# Patient Record
Sex: FEMALE | Race: WHITE | NOT HISPANIC OR LATINO | Employment: FULL TIME | URBAN - METROPOLITAN AREA
[De-identification: names, ages, dates, MRNs, and addresses within clinical notes are randomized per-mention and may not be internally consistent; named-entity substitution may affect disease eponyms.]

---

## 2020-07-01 ENCOUNTER — OFFICE VISIT (OUTPATIENT)
Dept: GASTROENTEROLOGY | Facility: CLINIC | Age: 45
End: 2020-07-01
Payer: COMMERCIAL

## 2020-07-01 VITALS
BODY MASS INDEX: 43.14 KG/M2 | WEIGHT: 214 LBS | TEMPERATURE: 98.3 F | HEIGHT: 59 IN | DIASTOLIC BLOOD PRESSURE: 88 MMHG | HEART RATE: 66 BPM | SYSTOLIC BLOOD PRESSURE: 126 MMHG

## 2020-07-01 DIAGNOSIS — R19.4 CHANGE IN BOWEL HABITS: ICD-10-CM

## 2020-07-01 DIAGNOSIS — R19.7 DIARRHEA, UNSPECIFIED TYPE: Primary | ICD-10-CM

## 2020-07-01 DIAGNOSIS — R12 HEARTBURN: ICD-10-CM

## 2020-07-01 PROCEDURE — 99203 OFFICE O/P NEW LOW 30 MIN: CPT | Performed by: NURSE PRACTITIONER

## 2020-07-01 RX ORDER — CHOLESTYRAMINE 4 G/9G
1 POWDER, FOR SUSPENSION ORAL 2 TIMES DAILY WITH MEALS
Qty: 60 PACKET | Refills: 2 | Status: SHIPPED | OUTPATIENT
Start: 2020-07-01

## 2020-07-01 NOTE — PATIENT INSTRUCTIONS
Check stool studies  Start taking Questran twice daily  Continue Imodium as needed     Arrange for colonoscopy at [facility]     Follow up in 6-8 weeks

## 2020-07-01 NOTE — PROGRESS NOTES
[address] [facility] - Outpatient Consultation  Ara López 38 y o  female MRN: [id number]  Encounter: [id number]    ASSESSMENT AND PLAN:      1  Diarrhea, unspecified type  2  Change in bowel habits  Change in bowel habits and diarrhea over the past 3 weeks  Has had an average of 4-6 episodes of diarrhea per day that occur almost immediately after eating  She did see her PCP who ordered labs and stool studies  Lab work reviewed including TSH, ESR, CRP, CBC, celiac panel which were all normal   C diff, fecal leukocytes, ova and parasites and stool enteric panel also negative  The patient did start taking Zoloft about 2 months ago for hormonal issues  Differential diagnoses include IBS, IBD, microscopic colitis, infectious cause  This could also be medication related as well  - will trial cholestyramine (QUESTRAN) 4 g packet; Take 1 packet (4 g total) by mouth 2 (two) times a day with meals  Dispense: 60 packet; Refill: 2  - check Giardia and Cryptosporidium Antigen Panel; Future  - will arrange for colonoscopy at [facility] [place]   - can take Imodium as needed  - if colonoscopy is unremarkable and symptoms persist, may need to follow up with PCP to consider switching to a different agent besides Zoloft      3  Heartburn   Occasional heartburn and reflux symptoms which are relieved with over-the-counter Tums  Denies any alarm symptoms  - continue to take Tums as needed  - GERD lifestyle modifications discussed with patient    Followup Appointment: 6-8 weeks   ______________________________________________________________________    Chief Complaint   Patient presents with    Diarrhea      3 weeks Dr Johanny Shahid       HPI:   Ara óLpez is a 38y o  year old female who presents to the office today as a new patient  She was referred by her PCP Dr Johanny Shahid  She states that she has had persistent diarrhea for the past 3 weeks    She reports that she has approximately 4-6 episodes of watery diarrhea per day  Her symptoms seem to be worse after meals, she reports that if she eats something she will have to use the bathroom within 30 minutes to 1 hour of eating  This is a change in her bowel habits, previously she was moving her bowels every day  She has had occasional abdominal cramping that does improve with a bowel movement  She reports that her stools were dark green in color and also looked black a few days ago  Associated with fatigue  She did start taking Zoloft about 2 months ago for hot flashes  She denies any other new medications, recent travel, antibiotic use, nocturnal stools, fecal incontinence, increased stress or anxiety  She denies any fever, chills, rectal bleeding  Her appetite and weight are stable  She does have intermittent heartburn and reflux symptoms which she takes Tums OTC PRN  She denies any dysphagia, odynophagia, early satiety  Historical Information   Past Medical History:   Diagnosis Date    Rheumatoid arthritis ([facility] [address] )      Past Surgical History:   Procedure Laterality Date     SECTION      TUBAL LIGATION      WRIST SURGERY       Social History     Substance and Sexual Activity   Alcohol Use Yes     Social History     Substance and Sexual Activity   Drug Use Not on file     Social History     Tobacco Use   Smoking Status Never Smoker   Smokeless Tobacco Never Used     Family History   Problem Relation Age of Onset    Cancer Father         esophageal     Inflammatory bowel disease Sister        Meds/Allergies     Current Outpatient Medications:     adalimumab (HUMIRA) 40 mg/0 8 mL PSKT    sertraline (ZOLOFT) 50 mg tablet    cholestyramine (QUESTRAN) 4 g packet    No Known Allergies    PHYSICAL EXAM:    Blood pressure 126/88, pulse 66, temperature 98 3 °F (36 8 °C), height 4' 11" (1 499 m), weight 97 1 kg (214 lb)  Body mass index is 43 22 kg/m²    General Appearance: NAD, cooperative, alert  Eyes: Anicteric, PERRLA, EOMI  ENT:  Normocephalic, atraumatic, normal mucosa  Neck:  Supple, symmetrical, trachea midline,   Resp:  Clear to auscultation bilaterally; no rales, rhonchi or wheezing; respirations unlabored   CV:  S1 S2, Regular rate and rhythm; no murmur, rub, or gallop  GI:  Soft, non-tender, non-distended; normal bowel sounds; no masses, no organomegaly   Rectal: Deferred  Musculoskeletal: No cyanosis, clubbing or edema  Normal ROM  Skin:  No jaundice, rashes, or lesions   Heme/Lymph: No palpable cervical lymphadenopathy  Psych: Normal affect, good eye contact  Neuro: No gross deficits, AAOx3    Lab Results:   No results found for: WBC, HGB, HCT, MCV, PLT  No results found for: NA, K, CL, CO2, ANIONGAP, BUN, CREATININE, GLUCOSE, GLUF, CALCIUM, CORRECTEDCA, AST, ALT, ALKPHOS, PROT, BILITOT, EGFR  No results found for: IRON, TIBC, FERRITIN  No results found for: LIPASE    Radiology Results:   No results found  REVIEW OF SYSTEMS:    CONSTITUTIONAL: Denies any fever, chills, rigors, and weight loss  HEENT: No earache or tinnitus  Denies hearing loss or visual disturbances  CARDIOVASCULAR: No chest pain or palpitations  RESPIRATORY: Denies any cough, hemoptysis, shortness of breath or dyspnea on exertion  GASTROINTESTINAL: As noted in the History of Present Illness  GENITOURINARY: No problems with urination  Denies any hematuria or dysuria  NEUROLOGIC: No dizziness or vertigo, denies headaches  MUSCULOSKELETAL: Denies any muscle or joint pain  SKIN: Denies skin rashes or itching  ENDOCRINE: Denies excessive thirst  Denies intolerance to heat or cold  PSYCHOSOCIAL: Denies depression or anxiety  Denies any recent memory loss

## 2020-07-07 ENCOUNTER — CLINICAL SUPPORT (OUTPATIENT)
Dept: GASTROENTEROLOGY | Facility: CLINIC | Age: 45
End: 2020-07-07

## 2020-07-07 VITALS — HEIGHT: 59 IN | WEIGHT: 214 LBS | BODY MASS INDEX: 43.14 KG/M2

## 2020-07-07 DIAGNOSIS — R19.7 DIARRHEA, UNSPECIFIED TYPE: Primary | ICD-10-CM

## 2020-07-07 DIAGNOSIS — Z11.59 SCREENING FOR VIRAL DISEASE: ICD-10-CM

## 2020-07-07 PROCEDURE — U0003 INFECTIOUS AGENT DETECTION BY NUCLEIC ACID (DNA OR RNA); SEVERE ACUTE RESPIRATORY SYNDROME CORONAVIRUS 2 (SARS-COV-2) (CORONAVIRUS DISEASE [COVID-19]), AMPLIFIED PROBE TECHNIQUE, MAKING USE OF HIGH THROUGHPUT TECHNOLOGIES AS DESCRIBED BY CMS-2020-01-R: HCPCS

## 2020-07-07 NOTE — PROGRESS NOTES
Virtual colon prep  Dx: diarrhea  Rx sent to provider for signature  Instructions for clenpiq given  Meds reviewed  Covid testing confirmed for 7/7/2020

## 2020-07-09 LAB
INPATIENT: NORMAL
SARS-COV-2 RNA SPEC QL NAA+PROBE: NOT DETECTED

## 2020-07-14 ENCOUNTER — HOSPITAL ENCOUNTER (OUTPATIENT)
Dept: GASTROENTEROLOGY | Facility: AMBULATORY SURGERY CENTER | Age: 45
Discharge: HOME | End: 2020-07-14
Payer: COMMERCIAL

## 2020-07-14 ENCOUNTER — ANESTHESIA EVENT (OUTPATIENT)
Dept: GASTROENTEROLOGY | Facility: AMBULATORY SURGERY CENTER | Age: 45
End: 2020-07-14

## 2020-07-14 ENCOUNTER — ANESTHESIA (OUTPATIENT)
Dept: GASTROENTEROLOGY | Facility: AMBULATORY SURGERY CENTER | Age: 45
End: 2020-07-14

## 2020-07-14 VITALS
DIASTOLIC BLOOD PRESSURE: 59 MMHG | TEMPERATURE: 97.3 F | OXYGEN SATURATION: 98 % | SYSTOLIC BLOOD PRESSURE: 100 MMHG | HEART RATE: 65 BPM | RESPIRATION RATE: 19 BRPM

## 2020-07-14 DIAGNOSIS — R19.4 CHANGE IN BOWEL HABITS: ICD-10-CM

## 2020-07-14 DIAGNOSIS — R19.7 DIARRHEA, UNSPECIFIED TYPE: ICD-10-CM

## 2020-07-14 PROCEDURE — 88305 TISSUE EXAM BY PATHOLOGIST: CPT | Performed by: PATHOLOGY

## 2020-07-14 PROCEDURE — 45380 COLONOSCOPY AND BIOPSY: CPT | Performed by: INTERNAL MEDICINE

## 2020-07-14 RX ORDER — SODIUM CHLORIDE 9 MG/ML
50 INJECTION, SOLUTION INTRAVENOUS CONTINUOUS
Status: DISCONTINUED | OUTPATIENT
Start: 2020-07-14 | End: 2020-07-18 | Stop reason: HOSPADM

## 2020-07-14 RX ORDER — PROPOFOL 10 MG/ML
INJECTION, EMULSION INTRAVENOUS AS NEEDED
Status: DISCONTINUED | OUTPATIENT
Start: 2020-07-14 | End: 2020-07-14 | Stop reason: SURG

## 2020-07-14 RX ADMIN — PROPOFOL 200 MG: 10 INJECTION, EMULSION INTRAVENOUS at 14:09

## 2020-07-14 RX ADMIN — SODIUM CHLORIDE 50 ML/HR: 9 INJECTION, SOLUTION INTRAVENOUS at 13:30

## 2020-07-14 RX ADMIN — PROPOFOL 200 MG: 10 INJECTION, EMULSION INTRAVENOUS at 13:56

## 2020-07-14 RX ADMIN — SODIUM CHLORIDE: 9 INJECTION, SOLUTION INTRAVENOUS at 13:45

## 2020-07-14 RX ADMIN — PROPOFOL 200 MG: 10 INJECTION, EMULSION INTRAVENOUS at 14:04

## 2020-07-14 NOTE — H&P
History and Physical - [facility]  Nallely Mann 38 y o  female MRN: [id number]                  HPI: Nallely Mann is a 38y o  year old female who presents for diarrhea  REVIEW OF SYSTEMS: Per the HPI, and otherwise unremarkable  Historical Information   Past Medical History:   Diagnosis Date    Rheumatoid arthritis ([address] )      Past Surgical History:   Procedure Laterality Date     SECTION      TUBAL LIGATION      WRIST SURGERY       Social History   Social History     Substance and Sexual Activity   Alcohol Use Yes    Frequency: Monthly or less     Social History     Substance and Sexual Activity   Drug Use Never     Social History     Tobacco Use   Smoking Status Never Smoker   Smokeless Tobacco Never Used     Family History   Problem Relation Age of Onset    Cancer Father         esophageal     Inflammatory bowel disease Sister        Meds/Allergies     Current Outpatient Medications:     cholestyramine (QUESTRAN) 4 g packet    sertraline (ZOLOFT) 50 mg tablet    Sod Picosulfate-Mag Ox-Cit Acd (Clenpiq) 10-3 5-12 MG-GM -GM/160ML SOLN    adalimumab (HUMIRA) 40 mg/0 8 mL PSKT    Current Facility-Administered Medications:     sodium chloride 0 9 % infusion, 50 mL/hr, Intravenous, Continuous, 50 mL/hr at 20 1330    Allergies   Allergen Reactions    Cefuroxime Rash       Objective     /69   Pulse 80   Temp (!) 97 3 °F (36 3 °C) (Temporal)   Resp 18   SpO2 96%       PHYSICAL EXAM    Gen: NAD AAOx3  CV: S1S2 RRR no m/r/g  CHEST: Clear b/l no c/r/w  ABD: +BS soft, NT/ND  EXT: no edema      ASSESSMENT/PLAN:  This is a 38y o  year old female here for colonoscopy, and she is stable and optimized for her procedure

## 2020-07-14 NOTE — ANESTHESIA PREPROCEDURE EVALUATION
Review of Systems/Medical History  Patient summary reviewed  Chart reviewed  No history of anesthetic complications     Cardiovascular  Exercise tolerance (METS): >4,  No pacemaker/AICD, No Hyperlipidemia, No hypertension , No valvular heart disease , No valve replacement ,    Pulmonary  Not a smoker , No pneumonia,        GI/Hepatic      Comment: Gi pain/diarrhea     No kidney stones, No kidney disease , No chronic kidney disease ,        Endo/Other  No diabetes , No history of thyroid disease ,   Obesity    GYN  Not currently pregnant , No prior pregnancy/OB history ,   No hysterectomy,        Hematology  No anemia ,  No coagulation disorder ,    Musculoskeletal  No muscular dystrophy , Rheumatoid arthritis ,   No arthritis     Neurology  No seizures ,  No neuromuscular disease , No TIA,    Psychology   No psychiatric history,              Physical Exam    Airway    Mallampati score: I  TM Distance: >3 FB  Neck ROM: full     Dental   No notable dental hx     Cardiovascular  Rhythm: regular, Rate: normal, No murmur, No friction rub, No carotid bruit, No peripheral edema, No systolic click, No JVD, No weak pulses, Cardiovascular exam normal    Pulmonary  Pulmonary exam normal No rhonchi, Breath sounds normal, No wheezes, No rales, No stridor    Other Findings        Anesthesia Plan  ASA Score- 3     Anesthesia Type- IV sedation with anesthesia with ASA Monitors  Additional Monitors:   Airway Plan:         Plan Factors-    Induction- intravenous  Postoperative Plan-     Informed Consent- Anesthetic plan and risks discussed with patient

## 2020-07-14 NOTE — DISCHARGE INSTRUCTIONS
Hemorrhoids   WHAT YOU NEED TO KNOW:   What are hemorrhoids? Hemorrhoids are swollen blood vessels inside your rectum (internal hemorrhoids) or on your anus (external hemorrhoids)  Sometimes a hemorrhoid may prolapse  This means it extends out of your anus  What increases my risk for hemorrhoids? · Pregnancy or obesity    · Straining or sitting for a long time during bowel movements    · Liver disease    · Weak muscles around the anus caused by older age, rectal surgery, or anal intercourse    · A lack of physical activity    · Chronic diarrhea or constipation    · A low-fiber diet  What are the signs and symptoms of hemorrhoids? · Pain or itching around your anus or inside your rectum    · Swelling or bumps around your anus    · Bright red blood in your bowel movement, on the toilet paper, or in the toilet bowl    · Tissue bulging out of your anus (prolapsed hemorrhoids)    · Incontinence (poor control over urine or bowel movements)  How are hemorrhoids diagnosed? Your healthcare provider will ask about your symptoms, the foods you eat, and your bowel movements  He will examine your anus for external hemorrhoids  You may need the following:  · A digital rectal exam  is a test to check for hemorrhoids  Your healthcare provider will put a gloved finger inside your anus to feel for the hemorrhoids  · An anoscopy  is a test that uses a scope (small tube with a light and camera on the end) to look at your hemorrhoids  How are hemorrhoids treated? Treatment will depend on your symptoms  You may need any of the following:  · Medicines  can help decrease pain and swelling, and soften your bowel movement  The medicine may be a pill, pad, cream, or ointment  · Procedures  may be used to shrink or remove your hemorrhoid  Examples include rubber-band ligation, sclerotherapy, and photocoagulation  These procedures may be done in your healthcare provider's office   Ask your healthcare provider for more information about these procedures  · Surgery  may be needed to shrink or remove your hemorrhoids  How can I manage my symptoms? · Apply ice on your anus for 15 to 20 minutes every hour or as directed  Use an ice pack, or put crushed ice in a plastic bag  Cover it with a towel before you apply it to your anus  Ice helps prevent tissue damage and decreases swelling and pain  · Take a sitz bath  Fill a bathtub with 4 to 6 inches of warm water  You may also use a sitz bath pan that fits inside a toilet bowl  Sit in the sitz bath for 15 minutes  Do this 3 times a day, and after each bowel movement  The warm water can help decrease pain and swelling  · Keep your anal area clean  Gently wash the area with warm water daily  Soap may irritate the area  After a bowel movement, wipe with moist towelettes or wet toilet paper  Dry toilet paper can irritate the area  How can I help prevent hemorrhoids? · Do not strain to have a bowel movement  Do not sit on the toilet too long  These actions can increase pressure on the tissues in your rectum and anus  · Drink plenty of liquids  Liquids can help prevent constipation  Ask how much liquid to drink each day and which liquids are best for you  · Eat a variety of high-fiber foods  Examples include fruits, vegetables, and whole grains  Ask your healthcare provider how much fiber you need each day  You may need to take a fiber supplement  · Exercise as directed  Exercise, such as walking, may make it easier to have a bowel movement  Ask your healthcare provider to help you create an exercise plan  · Do not have anal sex  Anal sex can weaken the skin around your rectum and anus  · Avoid heavy lifting  This can cause straining and increase your risk for another hemorrhoid  When should I seek immediate care? · You have severe pain in your rectum or around your anus  · You have severe pain in your abdomen and you are vomiting  · You have bleeding from your anus that soaks through your underwear  When should I contact my healthcare provider? · You have frequent and painful bowel movements  · Your hemorrhoid looks or feels more swollen than usual      · You do not have a bowel movement for 2 days or more  · You see or feel tissue coming through your anus  · You have questions or concerns about your condition or care  CARE AGREEMENT:   You have the right to help plan your care  Learn about your health condition and how it may be treated  Discuss treatment options with your caregivers to decide what care you want to receive  You always have the right to refuse treatment  The above information is an  only  It is not intended as medical advice for individual conditions or treatments  Talk to your doctor, nurse or pharmacist before following any medical regimen to see if it is safe and effective for you  © 2017 [address] [employer] Information is for End User's use only and may not be sold, redistributed or otherwise used for commercial purposes  All illustrations and images included in CareNotes® are the copyrighted property of A D A M [employer]  or Narendra Smith

## 2020-07-27 NOTE — RESULT ENCOUNTER NOTE
10 yr recall  Called patient  Discuss the results  Biopsies consistent with likely lymphocytic colitis  At this point, it appears that the flare has subsided as she is no longer having diarrhea  I advised that she can discontinue the Questran p r n  If the diarrhea returns and persists, can do a trial of budesonide  At this point, patient does not want any steroids  She will discuss further at the next office visit with Conner Garcia  I did explain to her the pathophysiology of microscopic colitis and that although it is not linked to increased risk for colon cancer, it does flare and cause symptom and the goal would be to treat the symptoms and shorten the duration of symptoms

## 2020-08-11 ENCOUNTER — TELEMEDICINE (OUTPATIENT)
Dept: GASTROENTEROLOGY | Facility: CLINIC | Age: 45
End: 2020-08-11
Payer: COMMERCIAL

## 2020-08-11 VITALS — BODY MASS INDEX: 42.33 KG/M2 | WEIGHT: 210 LBS | HEIGHT: 59 IN

## 2020-08-11 DIAGNOSIS — K52.832 LYMPHOCYTIC COLITIS: ICD-10-CM

## 2020-08-11 DIAGNOSIS — R19.7 DIARRHEA, UNSPECIFIED TYPE: Primary | ICD-10-CM

## 2020-08-11 DIAGNOSIS — R10.9 ABDOMINAL CRAMPING: ICD-10-CM

## 2020-08-11 DIAGNOSIS — R12 HEARTBURN: ICD-10-CM

## 2020-08-11 PROCEDURE — 99213 OFFICE O/P EST LOW 20 MIN: CPT | Performed by: NURSE PRACTITIONER

## 2020-08-11 RX ORDER — FAMOTIDINE 40 MG/1
40 TABLET, FILM COATED ORAL DAILY
Qty: 30 TABLET | Refills: 2 | Status: SHIPPED | OUTPATIENT
Start: 2020-08-11 | End: 2020-10-08 | Stop reason: SDUPTHER

## 2020-08-11 NOTE — PATIENT INSTRUCTIONS
Start taking Pepcid 40 mg at bedtime   Trial Levsin every 6 hours as needed for pain/cramping   Small, frequent meals   Continue to elevate head of the bed, avoid eating 2-3 hours close to bedtime   Avoid acidic foods     Follow up in 2-3 months         Diet for Stomach Ulcers and Gastritis   WHAT YOU NEED TO KNOW:   A diet for stomach ulcers and gastritis is a meal plan that limits foods that irritate your stomach  Certain foods may worsen symptoms such as stomach pain, bloating, heartburn, or indigestion  DISCHARGE INSTRUCTIONS:   Foods to limit or avoid:  You may need to avoid acidic, spicy, or high-fat foods  Not all foods affect everyone the same way  You will need to learn which foods worsen your symptoms and limit those foods  The following are some foods that may worsen ulcer or gastritis symptoms:  · Beverages:      ¨ Whole milk and chocolate milk    ¨ Hot cocoa and cola    ¨ Any beverage with caffeine    ¨ Regular and decaffeinated coffee    ¨ Peppermint and spearmint tea    ¨ Green and black tea, with or without caffeine    ¨ Orange and grapefruit juices    ¨ Drinks that contain alcohol    · Spices and seasonings:      ¨ Black and red pepper    ¨ Chili powder    ¨ Mustard seed and nutmeg    · Other foods:      ¨ Dairy foods made from whole milk or cream    ¨ Chocolate    ¨ Spicy or strongly flavored cheeses, such as jalapeno or black pepper    ¨ Highly seasoned, high-fat meats, such as sausage, salami, mtz, ham, and cold cuts    ¨ Hot chiles and peppers    ¨ Tomato products, such as tomato paste, tomato sauce, or tomato juice  Foods to include:  Eat a variety of healthy foods from all the food groups  Eat fruits, vegetables, whole grains, and fat-free or low-fat dairy foods  Whole grains include whole-wheat breads, cereals, pasta, and brown rice  Choose lean meats, poultry (chicken and turkey), fish, beans, eggs, and nuts  A healthy meal plan is low in unhealthy fats, salt, and added sugar   Healthy fats include olive oil and canola oil  Ask your dietitian for more information about a healthy diet  Other helpful guidelines:   · Do not eat right before bedtime  Stop eating at least 2 hours before bedtime  · Eat small, frequent meals  Your stomach may tolerate small, frequent meals better than large meals  © 2017 [address] [employer] Information is for End User's use only and may not be sold, redistributed or otherwise used for commercial purposes  All illustrations and images included in CareNotes® are the copyrighted property of [employer]  or Narendra Smith  The above information is an  only  It is not intended as medical advice for individual conditions or treatments  Talk to your doctor, nurse or pharmacist before following any medical regimen to see if it is safe and effective for you  Gastroesophageal Reflux Disease   WHAT YOU NEED TO KNOW:   Gastroesophageal reflux occurs when acid and food in the stomach back up into the esophagus  Gastroesophageal reflux disease (GERD) is reflux that occurs more than twice a week for a few weeks  It usually causes heartburn and other symptoms  GERD can cause other health problems over time if it is not treated  DISCHARGE INSTRUCTIONS:   Return to the emergency department if:   · You feel full and cannot burp or vomit  · You have severe chest pain and sudden trouble breathing  · Your bowel movements are black, bloody, or tarry-looking  · Your vomit looks like coffee grounds or has blood in it  Contact your healthcare provider if:   · You vomit large amounts, or you vomit often  · You have trouble breathing after you vomit  · You have trouble swallowing, or pain with swallowing  · You are losing weight without trying  · Your symptoms get worse or do not improve with treatment  · You have questions or concerns about your condition or care    Medicines:   · Medicines  are used to decrease stomach acid  Medicine may also be used to help your lower esophageal sphincter and stomach contract (tighten) more  · Take your medicine as directed  Contact your healthcare provider if you think your medicine is not helping or if you have side effects  Tell him of her if you are allergic to any medicine  Keep a list of the medicines, vitamins, and herbs you take  Include the amounts, and when and why you take them  Bring the list or the pill bottles to follow-up visits  Carry your medicine list with you in case of an emergency  Manage GERD:   · Do not have foods or drinks that may increase heartburn  These include chocolate, peppermint, fried or fatty foods, drinks that contain caffeine, or carbonated drinks (soda)  Other foods include spicy foods, onions, tomatoes, and tomato-based foods  Do not have foods or drinks that can irritate your esophagus, such as citrus fruits, juices, and alcohol  · Do not eat large meals  When you eat a lot of food at one time, your stomach needs more acid to digest it  Eat 6 small meals each day instead of 3 large ones, and eat slowly  Do not eat meals 2 to 3 hours before bedtime  · Elevate the head of your bed  Place 6-inch blocks under the head of your bed frame  You may also use more than one pillow under your head and shoulders while you sleep  · Maintain a healthy weight  If you are overweight, weight loss may help relieve symptoms of GERD  · Do not smoke  Smoking weakens the lower esophageal sphincter and increases the risk of GERD  Ask your healthcare provider for information if you currently smoke and need help to quit  E-cigarettes or smokeless tobacco still contain nicotine  Talk to your healthcare provider before you use these products  · Do not wear clothing that is tight around your waist   Tight clothing can put pressure on your stomach and cause or worsen GERD symptoms    Follow up with your healthcare provider as directed:  Write down your questions so you remember to ask them during your visits  © 2017 [address] [employer] Information is for End User's use only and may not be sold, redistributed or otherwise used for commercial purposes  All illustrations and images included in CareNotes® are the copyrighted property of [employer]  or Narendra Smith  The above information is an  only  It is not intended as medical advice for individual conditions or treatments  Talk to your doctor, nurse or pharmacist before following any medical regimen to see if it is safe and effective for you

## 2020-08-11 NOTE — PROGRESS NOTES
Virtual Regular Visit      Assessment/Plan:  1  Diarrhea, unspecified type   2  Lymphocytic colitis   Diarrhea Has improved since last office visit on 07/01  Lab work including TSH, ESR, CRP, CBC, celiac panel, C diff, fecal leukocytes,  Ova and parasite and stool enteric panel were not all negative  Colonoscopy performed on 07/14/20  Showed internal hemorrhoids, otherwise normal mucosa throughout the colon  Biopsies were consistent with lymphocytic colitis  Discussion with patient about natural course of lymphocytic colitis and that symptoms can be intermittent  Discussed treatment options including budesonide  Since her symptoms have resolved, will continue to monitor and asked patient to contact our office if her symptoms flare  3  Abdominal cramping   Does have intermittent lower abdominal pain and cramping that occurs a few times per week  Pain improves after defecation  She does note that her symptoms will worsen if she is experiencing increased stress or anxiety  Differential diagnoses include IBS  -  Will trial Levsin every 6 hours as needed    4  Heartburn   Still has occasional heartburn and reflux symptoms and states that her symptoms are now worse in the evening  She will often wake up at night with heartburn and reflux    Denies any alarm symptoms     - Trial Pepcid 40 mg at HS  - GERD lifestyle modifications discussed with patient  - Avoid food triggers such as acidic foods  - Continue to elevate head of the bed at night  - Avoid eating 2-3 hours before going to bed  - If symptoms are not improved to follow-up visit, will proceed with EGD to evaluate further    Follow up: 2-3 months       Problem List Items Addressed This Visit     None      Visit Diagnoses     Diarrhea, unspecified type    -  Primary    Lymphocytic colitis        Heartburn        Relevant Medications    famotidine (PEPCID) 40 MG tablet    Abdominal cramping        Relevant Medications    hyoscyamine (LEVSIN/SL) 0 125 mg SL tablet             Reason for visit is diarrhea  Chief Complaint   Patient presents with    Follow-up     Post Colon    Virtual Regular Visit        Encounter provider Anny Ferreira    Provider located at [address] [address] [phone]      Recent Visits  No visits were found meeting these conditions  Showing recent visits within past 7 days and meeting all other requirements     Today's Visits  Date Type Provider Dept   08/11/20 Telemedicine [facility]   Showing today's visits and meeting all other requirements     Future Appointments  No visits were found meeting these conditions  Showing future appointments within next 150 days and meeting all other requirements        The patient was identified by name and date of birth  Walker Rebollar was informed that this is a telemedicine visit and that the visit is being conducted through [employer] and patient was informed that this is not a secure, HIPAA-complaint platform  She agrees to proceed     My office door was closed  No one else was in the room  She acknowledged consent and understanding of privacy and security of the video platform  The patient has agreed to participate and understands they can discontinue the visit at any time  Patient is aware this is a billable service  Subjective  Walker Rebollar is a 38 y o  female who was seen for virtual visit for follow-up diarrhea  She states that her diarrhea has improved and she is having 1 soft formed bowel movement per day  She has been experiencing intermittent lower abdominal pain and cramping that is worse prior to moving her bowels but does improve with defecation  She rates pain 3 to 4/10  She does still experience occasional heartburn and reflux symptoms, her symptoms have been worse in the evening recently    Denies any fever, chills, dysphagia, odynophagia, early satiety, nausea, vomiting, hematochezia or melena  Her appetite and weight are stable  Past Medical History:   Diagnosis Date    Rheumatoid arthritis ([address] )        Past Surgical History:   Procedure Laterality Date     SECTION      TUBAL LIGATION      WRIST SURGERY         Current Outpatient Medications   Medication Sig Dispense Refill    adalimumab (HUMIRA) 40 mg/0 8 mL PSKT Inject 40 mg under the skin once      cholestyramine (QUESTRAN) 4 g packet Take 1 packet (4 g total) by mouth 2 (two) times a day with meals 60 packet 2    sertraline (ZOLOFT) 50 mg tablet Take 50 mg by mouth daily      famotidine (PEPCID) 40 MG tablet Take 1 tablet (40 mg total) by mouth daily 30 tablet 2    hyoscyamine (LEVSIN/SL) 0 125 mg SL tablet Take 1 tablet (0 125 mg total) by mouth every 6 (six) hours as needed for cramping 90 tablet 1     No current facility-administered medications for this visit           Allergies   Allergen Reactions    Cefuroxime Rash     Family History   Problem Relation Age of Onset    Cancer Father         esophageal     Inflammatory bowel disease Sister     Colon cancer Neg Hx      Social History     Socioeconomic History    Marital status: Unknown     Spouse name: None    Number of children: None    Years of education: None    Highest education level: None   Occupational History    None   Social Needs    Financial resource strain: None    Food insecurity     Worry: None     Inability: None    Transportation needs     Medical: None     Non-medical: None   Tobacco Use    Smoking status: Never Smoker    Smokeless tobacco: Never Used   Substance and Sexual Activity    Alcohol use: Yes     Frequency: Monthly or less    Drug use: Never    Sexual activity: None   Lifestyle    Physical activity     Days per week: None     Minutes per session: None    Stress: None   Relationships    Social connections     Talks on phone: None     Gets together: None Attends [employer] service: None     Active member of club or organization: None     Attends meetings of clubs or organizations: None     Relationship status: None    Intimate partner violence     Fear of current or ex partner: None     Emotionally abused: None     Physically abused: None     Forced sexual activity: None   Other Topics Concern    None   Social History Narrative    None         Review of Systems  REVIEW OF SYSTEMS:    CONSTITUTIONAL: Denies any fever, chills, rigors, and weight loss  HEENT: No earache or tinnitus  Denies hearing loss or visual disturbances  CARDIOVASCULAR: No chest pain or palpitations  RESPIRATORY: Denies any cough, hemoptysis, shortness of breath or dyspnea on exertion  GASTROINTESTINAL: As noted in the History of Present Illness  GENITOURINARY: No problems with urination  Denies any hematuria or dysuria  NEUROLOGIC: No dizziness or vertigo, denies headaches  MUSCULOSKELETAL: Denies any muscle or joint pain  SKIN: Denies skin rashes or itching  ENDOCRINE: Denies excessive thirst  Denies intolerance to heat or cold  PSYCHOSOCIAL: Denies depression or anxiety  Denies any recent memory loss  Video Exam    Vitals:    08/11/20 0918   Weight: 95 3 kg (210 lb)   Height: 4' 11" (1 499 m)       Physical Exam   General: appears well, no acute distress   HENT: Normocephalic, atraumatic, anicteric   Neck: supple neck, symmetrical   Lungs: Equal chest rise and unlabored breathing   Neuro: AAOx3, follow commands   Psych: cooperative, normal affect   Skin: No jaundice, rashes, or lesions         I spent 30 minutes directly with the patient during this visit      VIRTUAL VISIT DISCLAIMER    Alfonza Nyhan acknowledges that she has consented to an online visit or consultation   She understands that the online visit is based solely on information provided by her, and that, in the absence of a face-to-face physical evaluation by the physician, the diagnosis she receives is both limited and provisional in terms of accuracy and completeness  This is not intended to replace a full medical face-to-face evaluation by the physician  Ca Pearce understands and accepts these terms

## 2020-10-08 DIAGNOSIS — R12 HEARTBURN: ICD-10-CM

## 2020-10-08 RX ORDER — FAMOTIDINE 20 MG/1
40 TABLET, FILM COATED ORAL DAILY
Qty: 60 TABLET | Refills: 5 | Status: SHIPPED | OUTPATIENT
Start: 2020-10-08

## 2024-08-23 ENCOUNTER — OFFICE VISIT (OUTPATIENT)
Dept: URGENT CARE | Age: 49
End: 2024-08-23
Payer: COMMERCIAL

## 2024-08-23 VITALS
OXYGEN SATURATION: 99 % | RESPIRATION RATE: 20 BRPM | TEMPERATURE: 95.6 F | HEART RATE: 86 BPM | SYSTOLIC BLOOD PRESSURE: 130 MMHG | DIASTOLIC BLOOD PRESSURE: 73 MMHG

## 2024-08-23 DIAGNOSIS — L02.91 ABSCESS: Primary | ICD-10-CM

## 2024-08-23 PROCEDURE — 99213 OFFICE O/P EST LOW 20 MIN: CPT

## 2024-08-23 RX ORDER — DOXYCYCLINE 100 MG/1
100 TABLET ORAL 2 TIMES DAILY
Qty: 14 TABLET | Refills: 0 | Status: SHIPPED | OUTPATIENT
Start: 2024-08-23 | End: 2024-08-30

## 2024-08-23 NOTE — PROGRESS NOTES
[facility]        NAME: Ladan Esposito is a 48 y.o. female  : 1975    MRN: [id number]  DATE: 2024  TIME: 3:00 PM    Assessment and Plan   Abscess [L02.91]  1. Abscess  doxycycline (ADOXA) 100 MG tablet      Continue warm compresses, start antibiotics as directed.   Monitor for signs of worsening infection, such as worsening redness and fever, and report to PCP or ED for further evaluation if you notice these symptoms.       Patient Instructions   Patient Education     Skin abscess   The Basics   Written by the doctors and editors at [facility]   What is a skin abscess? -- A skin abscess is a painful bump that forms when pus collects under the skin (picture 1). It looks similar to a pimple, but is usually much larger. Skin abscesses most often form when there is cut or nick in the skin that allows bacteria from the skin's surface to get in. Sometimes, an ingrown hair can cause a skin abscess to form.  The bacterial infection causes the skin to become swollen and painful. It also makes the skin look red or darker in color. Pus forms as the body tries to fight off the bacteria.  Less often, a skin abscess might be caused by another type of germ, like a virus, fungus, or parasite.  What are the symptoms of a skin abscess? -- Symptoms might include:   A large bump that looks like a pimple - The bump might drain fluid or pus.   Swollen or red skin around the abscess   Pain, especially when touched  In some cases, a skin abscess might also cause fever or chills. But this is uncommon.  Skin abscesses can form anywhere on the body. But they are most common on the arms, legs, back, chest, and belly.  Will I need tests? -- Probably not. In most cases, your doctor or nurse can tell if you have a skin abscess by asking about your symptoms and doing an exam.  In rare cases when tests are needed, they might include:   Lab tests   Imaging tests - These create pictures of the inside of the body.  In some  "cases, your doctor or nurse might want to do lab tests on the pus from the abscess.  How is a skin abscess treated? -- With most abscesses, a doctor will make a small cut on the surface of the abscess to drain out the pus. For small abscesses that are draining pus on their own, cutting into the abscess might not be needed. \"Small\" usually means less than 3/4 inch (2 cm) in size.  In most cases, your doctor or nurse will also prescribe an antibiotic medicine. Antibiotics help kill the bacteria that caused the abscess and keep the skin from getting infected again. If you get antibiotics, finish all of the medicine and take it exactly as instructed. Never skip doses or stop taking the medicine without talking to your doctor or nurse.  Is there anything I can do on my own to feel better? -- Yes. To relieve symptoms and help your skin abscess drain, you can put a warm, wet compress on the area:   Wet a clean washcloth with warm water, and put it over your abscess.   When the washcloth cools, reheat it with warm water and put it back over the abscess.   Repeat these steps for about 15 minutes, and try to do this 4 times a day.  Do not try to squeeze or pop an abscess. This can make it worse.  When should I call the doctor? -- Call your doctor or nurse if:   Your abscess is getting bigger.   You have signs that your infection is getting worse - These include a fever of 100.4°F (38°C) or higher, or more redness around the abscess.   Your abscess is on your breast or in your genital or anal area.  All topics are updated as new evidence becomes available and our peer review process is complete.  This topic retrieved from [employer] on: Apr 11, 2024.  Topic 646867 Version 2.0  Release: 32.3.2 - C32.100  © 2024 UpToDate, Inc. and/or its affiliates. All rights reserved.  picture 1: Skin abscess     A skin abscess is a painful bump that forms when pus collects under the skin.  Courtesy of Haris Alvares MD.  Graphic [id number] " Version 2.0  Consumer Information Use and Disclaimer   Disclaimer: This generalized information is a limited summary of diagnosis, treatment, and/or medication information. It is not meant to be comprehensive and should be used as a tool to help the user understand and/or assess potential diagnostic and treatment options. It does NOT include all information about conditions, treatments, medications, side effects, or risks that may apply to a specific patient. It is not intended to be medical advice or a substitute for the medical advice, diagnosis, or treatment of a health care provider based on the health care provider's examination and assessment of a patient's specific and unique circumstances. Patients must speak with a health care provider for complete information about their health, medical questions, and treatment options, including any risks or benefits regarding use of medications. This information does not endorse any treatments or medications as safe, effective, or approved for treating a specific patient. UpToDate, Inc. and its affiliates disclaim any warranty or liability relating to this information or the use thereof.The use of this information is governed by the Terms of Use, available at https://www.[employer].com/en/know/clinical-effectiveness-terms. 2024© UpToDate, Inc. and its affiliates and/or licensors. All rights reserved.  Copyright   © 2024 UpToDate, Inc. and/or its affiliates. All rights reserved.       Follow up with PCP in 3-5 days.  Proceed to  ER if symptoms worsen.    Chief Complaint     Chief Complaint   Patient presents with    Abscess     Started with abscess 1 1/2 weeks ago under left axilla. Denies drainage . Patient attempted to express, was not able too. Red and swollen.  History of abscesses.         History of Present Illness       48 year old female with PMH significant for recurrent abscess of the axillary region, rheumatoid arthritis, presents for evaluation of left  axillary abscess. She first noticed the abscess about 1.5 weeks ago, and has been applying warm compresses without relief. She notes that she has been experiencing recurrent abscesses of the axillae since menopause, which she attributes to night sweats and the under wire of her bra (she notes improvement in condition since buying bras without under wire). She denies fever, drainage, body aches, chills.     Abscess  Pertinent negatives include no arthralgias, chest pain, congestion, coughing, fatigue, fever, headaches, myalgias, nausea, neck pain, numbness, rash, sore throat or vomiting.       Review of Systems   Review of Systems   Constitutional:  Negative for fatigue and fever.   HENT:  Negative for congestion, ear discharge, ear pain, postnasal drip, rhinorrhea, sinus pressure, sinus pain, sneezing and sore throat.    Eyes: Negative.  Negative for pain, discharge, redness and itching.   Respiratory: Negative.  Negative for apnea, cough, choking, chest tightness, shortness of breath, wheezing and stridor.    Cardiovascular: Negative.  Negative for chest pain and palpitations.   Gastrointestinal: Negative.  Negative for diarrhea, nausea and vomiting.   Endocrine: Negative.  Negative for polydipsia, polyphagia and polyuria.   Genitourinary: Negative.  Negative for decreased urine volume and flank pain.   Musculoskeletal: Negative.  Negative for arthralgias, back pain, myalgias, neck pain and neck stiffness.   Skin:  Positive for color change and wound. Negative for pallor and rash.   Allergic/Immunologic: Negative.  Negative for environmental allergies.   Neurological: Negative.  Negative for dizziness, facial asymmetry, light-headedness, numbness and headaches.   Hematological: Negative.  Negative for adenopathy.   Psychiatric/Behavioral: Negative.           Current Medications       Current Outpatient Medications:     doxycycline (ADOXA) 100 MG tablet, Take 1 tablet (100 mg total) by mouth 2 (two) times a day for  7 days, Disp: 14 tablet, Rfl: 0    omeprazole (PriLOSEC) 20 mg delayed release capsule, Take 20 mg by mouth daily, Disp: , Rfl:     sertraline (ZOLOFT) 50 mg tablet, Take 50 mg by mouth daily, Disp: , Rfl:     adalimumab (HUMIRA) 40 mg/0.8 mL PSKT, Inject 40 mg under the skin once (Patient not taking: Reported on 2024), Disp: , Rfl:     cholestyramine (QUESTRAN) 4 g packet, Take 1 packet (4 g total) by mouth 2 (two) times a day with meals (Patient not taking: Reported on 2024), Disp: 60 packet, Rfl: 2    famotidine (PEPCID) 20 mg tablet, Take 2 tablets (40 mg total) by mouth daily (Patient not taking: Reported on 2024), Disp: 60 tablet, Rfl: 5    hyoscyamine (LEVSIN/SL) 0.125 mg SL tablet, Take 1 tablet (0.125 mg total) by mouth every 6 (six) hours as needed for cramping (Patient not taking: Reported on 2024), Disp: 90 tablet, Rfl: 1    Current Allergies     Allergies as of 2024 - Reviewed 2020   Allergen Reaction Noted    Cefuroxime Rash 2020            The following portions of the patient's history were reviewed and updated as appropriate: allergies, current medications, past family history, past medical history, past social history, past surgical history and problem list.     Past Medical History:   Diagnosis Date    Rheumatoid arthritis (HCC)        Past Surgical History:   Procedure Laterality Date     SECTION      COLONOSCOPY  2020    Small internal hemorrhoids, normal mucosa.  Biopsies consistent with lymphocytic colitis    TUBAL LIGATION      WRIST SURGERY         Family History   Problem Relation Age of Onset    Cancer Father         esophageal     Inflammatory bowel disease Sister     Colon cancer Neg Hx          Medications have been verified.        Objective   /73   Pulse 86   Temp (!) 95.6 °F (35.3 °C) (Tympanic)   Resp 20   LMP 2024   SpO2 99%        Physical Exam     Physical Exam  Vitals and nursing note reviewed.    Constitutional:       General: She is not in acute distress.     Appearance: Normal appearance. She is not ill-appearing, toxic-appearing or diaphoretic.   HENT:      Head: Normocephalic and atraumatic.      Right Ear: External ear normal.      Left Ear: External ear normal.      Nose: Nose normal. No congestion or rhinorrhea.      Mouth/Throat:      Mouth: Mucous membranes are moist.   Eyes:      Extraocular Movements: Extraocular movements intact.      Conjunctiva/sclera: Conjunctivae normal.      Pupils: Pupils are equal, round, and reactive to light.   Cardiovascular:      Rate and Rhythm: Normal rate and regular rhythm.      Pulses: Normal pulses.      Heart sounds: Normal heart sounds. No murmur heard.     No friction rub. No gallop.   Pulmonary:      Effort: Pulmonary effort is normal. No respiratory distress.      Breath sounds: Normal breath sounds. No stridor. No wheezing, rhonchi or rales.   Abdominal:      General: Bowel sounds are normal.      Palpations: Abdomen is soft.      Tenderness: There is no abdominal tenderness. There is no guarding or rebound.   Musculoskeletal:         General: Normal range of motion.      Cervical back: Normal range of motion and neck supple. No tenderness.   Skin:     General: Skin is warm and dry.      Capillary Refill: Capillary refill takes less than 2 seconds.      Findings: Abscess present.             Comments: Small, mildly fluctuant abscess of left inner arm just above left axillae. No drainage, (+) surrounding erythema.    Neurological:      General: No focal deficit present.      Mental Status: She is alert and oriented to person, place, and time.      Cranial Nerves: No cranial nerve deficit.   Psychiatric:         Mood and Affect: Mood normal.         Behavior: Behavior normal.

## 2024-08-23 NOTE — PATIENT INSTRUCTIONS
Continue warm compresses, start antibiotics as directed.   Monitor for signs of worsening infection, such as worsening redness and fever, and report to PCP or ED for further evaluation if you notice these symptoms.

## 2024-08-26 ENCOUNTER — OFFICE VISIT (OUTPATIENT)
Dept: URGENT CARE | Facility: CLINIC | Age: 49
End: 2024-08-26
Payer: COMMERCIAL

## 2024-08-26 VITALS
SYSTOLIC BLOOD PRESSURE: 141 MMHG | OXYGEN SATURATION: 99 % | WEIGHT: 235 LBS | BODY MASS INDEX: 47.37 KG/M2 | HEART RATE: 79 BPM | TEMPERATURE: 97.7 F | DIASTOLIC BLOOD PRESSURE: 64 MMHG | RESPIRATION RATE: 16 BRPM | HEIGHT: 59 IN

## 2024-08-26 DIAGNOSIS — L02.412 ABSCESS OF LEFT AXILLA: Primary | ICD-10-CM

## 2024-08-26 PROBLEM — M06.9 RHEUMATOID ARTHRITIS (HCC): Status: ACTIVE | Noted: 2022-03-18

## 2024-08-26 PROCEDURE — 10060 I&D ABSCESS SIMPLE/SINGLE: CPT

## 2024-08-26 PROCEDURE — 99213 OFFICE O/P EST LOW 20 MIN: CPT

## 2024-08-26 RX ORDER — MUPIROCIN 20 MG/G
OINTMENT TOPICAL 3 TIMES DAILY
Qty: 22 G | Refills: 0 | Status: SHIPPED | OUTPATIENT
Start: 2024-08-26

## 2024-08-26 NOTE — PROGRESS NOTES
[facility]        NAME: Ladan Esposito is a 48 y.o. female  : 1975    MRN: [id number]  DATE: 2024  TIME: 6:38 PM    Assessment and Plan   Abscess of left axilla [L02.412]  1. Abscess of left axilla  Ambulatory Referral to Wound Care    Ambulatory Referral to General Surgery    mupirocin (BACTROBAN) 2 % ointment        Bedside I&D done with mild improvement of symptoms (see procedure documentation). Unable to do wound culture as patient has already completed 3 days of antibiotics. Will add in Bactroban and refer to General Surgery/Wound Care for further management. Educated on OTC products for pain relief. ER precautions discussed. Patient agreeable to plan.       Patient Instructions     Continue antibiotics and topical treatment as prescribed. Apply warm compresses and take tylenol/ibuprofen as needed for pain. Follow-up with wound care if no improvement of symptoms after one week. Report to ER if symptoms worsen or develop worsening pain, redness, or swelling around site of injury.      Chief Complaint     Chief Complaint   Patient presents with    wound abscess     2 weeks ago pt went to urgent care and abscess was lanced and was placed on antibiotics. Pain has not resolved and and feels as if the abscess is getting worse.         History of Present Illness       48 year old female presents for evaluation of abscess to her left axilla ongoing for the past week. She was seen in the clinic 4 days ago where a bedside I&D was done and she was started on Doxycycline. She reports symptoms seemed to improved but she developed increased pain/swelling last night. She denies any fevers, fatigue, chills, or streaking. She has applied warm compresses and took 3 days of antibiotics with minimal improvement.    Wound Check  She was originally treated 3 to 5 days ago. Previous treatment included oral antibiotics and I&D of abscess. Her temperature was unmeasured prior to arrival. There has been no  drainage from the wound. There is no redness present. There is new swelling present. There is new pain present. She has no difficulty moving the affected extremity or digit.       Review of Systems   Review of Systems   Constitutional:  Negative for activity change, appetite change, chills, fatigue and fever.   Respiratory:  Negative for cough.    Cardiovascular:  Negative for chest pain.   Gastrointestinal:  Negative for abdominal pain, constipation, diarrhea, nausea and vomiting.   Musculoskeletal:  Negative for arthralgias, back pain, myalgias and neck pain.   Skin:  Positive for color change and wound. Negative for pallor and rash.   Allergic/Immunologic: Negative for environmental allergies and food allergies.   Neurological:  Negative for dizziness, light-headedness and headaches.         Current Medications       Current Outpatient Medications:     doxycycline (ADOXA) 100 MG tablet, Take 1 tablet (100 mg total) by mouth 2 (two) times a day for 7 days, Disp: 14 tablet, Rfl: 0    mupirocin (BACTROBAN) 2 % ointment, Apply topically 3 (three) times a day, Disp: 22 g, Rfl: 0    omeprazole (PriLOSEC) 20 mg delayed release capsule, Take 20 mg by mouth daily, Disp: , Rfl:     sertraline (ZOLOFT) 50 mg tablet, Take 50 mg by mouth daily, Disp: , Rfl:     adalimumab (HUMIRA) 40 mg/0.8 mL PSKT, Inject 40 mg under the skin once (Patient not taking: Reported on 8/23/2024), Disp: , Rfl:     cholestyramine (QUESTRAN) 4 g packet, Take 1 packet (4 g total) by mouth 2 (two) times a day with meals (Patient not taking: Reported on 8/23/2024), Disp: 60 packet, Rfl: 2    famotidine (PEPCID) 20 mg tablet, Take 2 tablets (40 mg total) by mouth daily (Patient not taking: Reported on 8/23/2024), Disp: 60 tablet, Rfl: 5    hyoscyamine (LEVSIN/SL) 0.125 mg SL tablet, Take 1 tablet (0.125 mg total) by mouth every 6 (six) hours as needed for cramping (Patient not taking: Reported on 8/23/2024), Disp: 90 tablet, Rfl: 1    Current Allergies  "    Allergies as of 2024 - Reviewed 2024   Allergen Reaction Noted    Cefuroxime Rash 2020            The following portions of the patient's history were reviewed and updated as appropriate: allergies, current medications, past family history, past medical history, past social history, past surgical history and problem list.     Past Medical History:   Diagnosis Date    Rheumatoid arthritis (HCC)        Past Surgical History:   Procedure Laterality Date     SECTION      COLONOSCOPY  2020    Small internal hemorrhoids, normal mucosa.  Biopsies consistent with lymphocytic colitis    TUBAL LIGATION      WRIST SURGERY         Family History   Problem Relation Age of Onset    Cancer Father         esophageal     Inflammatory bowel disease Sister     Colon cancer Neg Hx          Medications have been verified.        Objective   /64 (BP Location: Right arm, Patient Position: Sitting, Cuff Size: Standard)   Pulse 79   Temp 97.7 °F (36.5 °C) (Temporal)   Resp 16   Ht 4' 11\" (1.499 m)   Wt 107 kg (235 lb)   LMP 2024   SpO2 99%   BMI 47.46 kg/m²        Physical Exam     Physical Exam  Vitals and nursing note reviewed.   Constitutional:       General: She is awake. She is not in acute distress.     Appearance: Normal appearance. She is overweight.   Cardiovascular:      Rate and Rhythm: Normal rate.      Pulses: Normal pulses.   Pulmonary:      Effort: Pulmonary effort is normal.   Musculoskeletal:         General: Swelling and tenderness present. Normal range of motion.      Cervical back: Normal range of motion and neck supple.   Skin:     General: Skin is warm and dry.      Findings: Abscess and erythema present. No rash.             Comments: Left axilla abscess (1 cm in diameter), mild surrounding erythema. No discharge present.    Neurological:      General: No focal deficit present.      Mental Status: She is alert and oriented to person, place, and time. "   Psychiatric:         Mood and Affect: Mood normal.         Behavior: Behavior normal. Behavior is cooperative.         Judgment: Judgment normal.         Incision and drain    Date/Time: 8/26/2024 6:00 PM    Performed by: LUIS Brown  Authorized by: LUIS Brown  Universal Protocol:  procedure performed by consultantConsent: Verbal consent obtained.  Risks and benefits: risks, benefits and alternatives were discussed  Consent given by: patient  Patient understanding: patient states understanding of the procedure being performed  Patient consent: the patient's understanding of the procedure matches consent given  Required items: required blood products, implants, devices, and special equipment available  Patient identity confirmed: verbally with patient    Patient location:  Clinic  Location:     Type:  Abscess    Location:  Upper extremity    Upper extremity location: Left axilla.  Pre-procedure details:     Skin preparation:  Betadine  Anesthesia (see MAR for exact dosages):     Anesthesia method:  Local infiltration    Local anesthetic:  Lidocaine 2% WITH epi  Procedure details:     Complexity:  Simple    Needle aspiration: no      Incision types:  Stab incision    Scalpel blade:  11    Approach:  Puncture    Incision depth:  Subcutaneous    Wound management:  Probed and deloculated and irrigated with saline    Irrigation with saline:  10    Drainage:  Bloody and serosanguinous    Drainage amount:  Moderate    Wound treatment:  Wound left open    Packing materials:  None  Post-procedure details:     Patient tolerance of procedure:  Tolerated well, no immediate complications  Comments:      Incision made with 11 blade scalpel